# Patient Record
Sex: FEMALE | Race: WHITE | Employment: FULL TIME | URBAN - METROPOLITAN AREA
[De-identification: names, ages, dates, MRNs, and addresses within clinical notes are randomized per-mention and may not be internally consistent; named-entity substitution may affect disease eponyms.]

---

## 2019-03-02 ENCOUNTER — HOSPITAL ENCOUNTER (EMERGENCY)
Age: 19
Discharge: HOME | End: 2019-03-02
Attending: EMERGENCY MEDICINE

## 2019-03-02 DIAGNOSIS — R10.2 PELVIC PAIN IN FEMALE: ICD-10-CM

## 2019-03-02 DIAGNOSIS — N32.89 BLADDER SPASM: Primary | ICD-10-CM

## 2019-03-02 LAB
BACTERIA: ABNORMAL /HPF
BILIRUBIN URINE: NEGATIVE
BLOOD, URINE: ABNORMAL
CLARITY: CLEAR
COLOR: YELLOW
EPITHELIAL CELLS, UA: ABNORMAL /HPF
GLUCOSE URINE: NEGATIVE MG/DL
HCG(URINE) PREGNANCY TEST: NEGATIVE
KETONES, URINE: NEGATIVE MG/DL
LEUKOCYTE ESTERASE, URINE: NEGATIVE
MICROSCOPIC EXAMINATION: YES
MUCUS: ABNORMAL /LPF
NITRITE, URINE: NEGATIVE
PH UA: 6 (ref 5–8)
PROTEIN UA: NEGATIVE MG/DL
RBC UA: ABNORMAL /HPF (ref 0–2)
SPECIFIC GRAVITY UA: 1.02 (ref 1–1.03)
URINE TYPE: ABNORMAL
UROBILINOGEN, URINE: 1 E.U./DL
WBC UA: ABNORMAL /HPF (ref 0–5)

## 2019-03-02 PROCEDURE — 99284 EMERGENCY DEPT VISIT MOD MDM: CPT

## 2019-03-03 LAB — URINE CULTURE, ROUTINE: NORMAL

## 2021-10-20 ENCOUNTER — HOSPITAL ENCOUNTER (EMERGENCY)
Age: 21
Discharge: HOME | End: 2021-10-20
Attending: STUDENT IN AN ORGANIZED HEALTH CARE EDUCATION/TRAINING PROGRAM
Payer: COMMERCIAL

## 2021-10-20 VITALS
DIASTOLIC BLOOD PRESSURE: 89 MMHG | SYSTOLIC BLOOD PRESSURE: 120 MMHG | HEIGHT: 63 IN | WEIGHT: 172 LBS | BODY MASS INDEX: 30.48 KG/M2 | RESPIRATION RATE: 16 BRPM | OXYGEN SATURATION: 96 % | HEART RATE: 89 BPM | TEMPERATURE: 97.8 F

## 2021-10-20 DIAGNOSIS — J06.9 ACUTE UPPER RESPIRATORY INFECTION: ICD-10-CM

## 2021-10-20 DIAGNOSIS — J02.9 ACUTE PHARYNGITIS, UNSPECIFIED ETIOLOGY: Primary | ICD-10-CM

## 2021-10-20 LAB
S PYO AG THROAT QL: NEGATIVE
SARS-COV-2, NAAT: NOT DETECTED

## 2021-10-20 PROCEDURE — 6360000002 HC RX W HCPCS: Performed by: PHYSICIAN ASSISTANT

## 2021-10-20 PROCEDURE — 99284 EMERGENCY DEPT VISIT MOD MDM: CPT

## 2021-10-20 PROCEDURE — 87880 STREP A ASSAY W/OPTIC: CPT

## 2021-10-20 PROCEDURE — 87081 CULTURE SCREEN ONLY: CPT

## 2021-10-20 PROCEDURE — 87635 SARS-COV-2 COVID-19 AMP PRB: CPT

## 2021-10-20 RX ORDER — ASPIRIN 325 MG
325 TABLET ORAL ONCE
Status: COMPLETED | OUTPATIENT
Start: 2021-10-20 | End: 2021-10-20

## 2021-10-20 RX ORDER — DEXAMETHASONE 4 MG/1
10 TABLET ORAL ONCE
Status: COMPLETED | OUTPATIENT
Start: 2021-10-20 | End: 2021-10-20

## 2021-10-20 RX ADMIN — Medication 325 MG: at 11:41

## 2021-10-20 RX ADMIN — DEXAMETHASONE 10 MG: 4 TABLET ORAL at 11:41

## 2021-10-20 NOTE — ED NOTES
Patient to ED from home for a covid exposure. Patient states that she recently was around someone that tested positive. Patient states that she now has a sore throat and some shortness of breath on exertion. Patient has received both doses of covid vaccine. Patient is well appearing and in no visible distress on assessment. Patient respirations are even and unlabored, patient able to answer questions in full sentences, skin warm and dry. Patient maintaining O2 saturation on room air. VSS. Call light is within reach.      Johnny Sanchez RN  10/20/21 1110

## 2021-10-20 NOTE — ED PROVIDER NOTES
**ADVANCED PRACTICE PROVIDER, I HAVE EVALUATED THIS [facility]  ED  EMERGENCY DEPARTMENT ENCOUNTER      Pt Name: Dori Coello  FA  [place] 2000  Date of evaluation: 10/20/2021  Provider: FLAQUITO Early      Chief Complaint:    Chief Complaint   Patient presents with    Concern For COVID-19         Nursing Notes, Past Medical Hx, Past Surgical Hx, Social Hx, Allergies, and Family Hx were all reviewed and agreed with or any disagreements were addressed in the HPI.    HPI: (Location, Duration, Timing, Severity, Quality, Assoc Sx, Context, Modifying factors)    Chief Complaint of pharyngitis. This is a  19 y.o. female who presents via personal transport complaining of pharyngitis, mild shortness of breath with exertion x 3 days. She states she hung out with her friend who had a toddler stick with COVID who is not out of the quarantine window therefore she is concerned about Covid. She has not had any fevers or chills. She does admit to chest pressure and tightness with the dyspnea on exertion. She states she has a history of strep throat and feels this is similar. She describes her sore throat as feeling like there is glass in her throat when she follows. She denies abdominal pain, nausea, vomiting, diarrhea, constipation. She denies dysuria or hematuria. She has not taken anything for her symptoms. She has no other concerns or complaints at this time. PastMedical/Surgical History:      Diagnosis Date    Anxiety     Asthma     Depression     Urinary reflux          Procedure Laterality Date    FRACTURE SURGERY      right pinkie at growth plate       Medications:  Previous Medications    No medications on file         Review of Systems:  (2-9 systems needed)    \"Positives and Pertinent negatives as per HPI\"    Physical Exam:  Physical Exam  Vitals and nursing note reviewed. Constitutional:       Appearance: Normal appearance.  She is not diaphoretic. HENT:      Head: Normocephalic and atraumatic. Nose: Nose normal. No congestion or rhinorrhea. Mouth/Throat:      Mouth: Mucous membranes are moist.      Pharynx: Uvula midline. No pharyngeal swelling, posterior oropharyngeal erythema or uvula swelling. Tonsils: Tonsillar exudate present. Eyes:      General:         Right eye: No discharge. Left eye: No discharge. Cardiovascular:      Rate and Rhythm: Normal rate and regular rhythm. Pulses: Normal pulses. Heart sounds: Normal heart sounds. No murmur heard. No friction rub. No gallop. Pulmonary:      Effort: Pulmonary effort is normal. No respiratory distress. Breath sounds: Normal breath sounds. No stridor. No wheezing, rhonchi or rales. Abdominal:      General: Abdomen is flat. Palpations: Abdomen is soft. Tenderness: There is no abdominal tenderness. There is no right CVA tenderness, left CVA tenderness, guarding or rebound. Musculoskeletal:         General: Normal range of motion. Cervical back: Normal range of motion and neck supple. Skin:     General: Skin is warm and dry. Coloration: Skin is not pale. Neurological:      Mental Status: She is alert and oriented to person, place, and time.    Psychiatric:         Mood and Affect: Mood normal.         Behavior: Behavior normal.         MEDICAL DECISION MAKING    Vitals:    Vitals:    10/20/21 1110   BP: 120/89   Pulse: 89   Resp: 16   Temp: 97.8 °F (36.6 °C)   TempSrc: Oral   SpO2: 96%   Weight: 172 lb (78 kg)   Height: 5' 3\" (1.6 m)       LABS:  Labs Reviewed   STREP SCREEN GROUP A THROAT    Narrative:     Performed at:  [facility]) [address],  [place], [address] [employer]   Phone [phone], RAPID    Narrative:     Performed at:  [address],  [place], [address] [employer]   Phone [phone]   CULTURE, BETA STREP CONFIRM PLATES Remainder of labs reviewed and were negative at this time or not returned at the time of this note. RADIOLOGY:   Non-plain film images such as CT, Ultrasound and MRI are read by the radiologist. FLAQUITO Bernabe have directly visualized the radiologic plain film image(s) with the below findings:      Interpretation per the Radiologist below, if available at the time of this note:    No orders to display        No results found. MEDICAL DECISION MAKING / ED COURSE:          Patient was given:  Medications   dexamethasone (DECADRON) tablet 10 mg (10 mg Oral Given 10/20/21 1141)   aspirin tablet 325 mg (325 mg Oral Given 10/20/21 1141)     Patient was seen and evaluated in the emergency department for concern for Covid. Primary complaint is pharyngitis x3 days. She is hemodynamically stable at triage, she is afebrile, not tachycardic, 96% SpO2 on RA. After discussing with the patient her symptoms are most consistent with strep vs. Covid. I did discuss with the patient that we could obtain a chest x-ray however at this time I do not believe that we will change our management and her lung exam is very reassuring therefore after shared decision making it is decided to forego the chest x-ray at this time. Strep rapid negative, culture pending  Covid rapid negative    A discussion was had with the patient regarding all lab results. She did receive 10 mg of Decadron and aspirin while in the emergency department. I do believe her symptoms are likely viral in etiology at this time although she will receive a phone call of cultured strep results if they are positive she will be treated with an antibiotic at that time. She does not have a primary care physician and will be provided a referral upon discharge. The patient tolerated their visit well. I evaluated the patient. The physician was available for consultation as needed.   The patient and / or the family were informed of the results of any tests, a time was given to answer questions, a plan was proposed and they agreed with plan. CLINICAL IMPRESSION:  1. Acute pharyngitis, unspecified etiology    2. Acute upper respiratory infection      Results for orders placed or performed during the hospital encounter of 10/20/21   Strep screen group a throat    Specimen: Throat   Result Value Ref Range    Rapid Strep A Screen Negative Negative   COVID-19, Rapid    Specimen: Nasopharyngeal Swab   Result Value Ref Range    SARS-CoV-2, NAAT Not Detected Not Detected         I estimate there is LOW risk for EPIGLOTTITIS, PNEUMONIA, MENINGITIS, OR URINARY TRACT INFECTION, thus I consider the discharge disposition reasonable. Also, there is no evidence or peritonitis, sepsis, or toxicity. Antwon Bo and I have discussed the diagnosis and risks, and we agree with discharging home to follow-up with their primary doctor. We also discussed returning to the Emergency Department immediately if new or worsening symptoms occur. We have discussed the symptoms which are most concerning (e.g., changing or worsening pain, trouble swallowing or breating, neck stiffness, fever) that necessitate immediate return. Final Impression    1. Acute pharyngitis, unspecified etiology    2. Acute upper respiratory infection        Discharge Vital Signs:  Blood pressure 120/89, pulse 89, temperature 97.8 °F (36.6 °C), temperature source Oral, resp. rate 16, height 5' 3\" (1.6 m), weight 172 lb (78 kg), SpO2 96 %, not currently breastfeeding.       DISPOSITION        PATIENT REFERRED TO:  Wilmar Hernandez  [address] [place] [address]  [address]  Schedule an appointment as soon as possible for a visit       [facility]  ED  [address] [facility] [address]  Go to   If symptoms worsen      DISCHARGE MEDICATIONS:  New Prescriptions    No medications on file       DISCONTINUED MEDICATIONS:  Discontinued Medications    ALBUTEROL (VENTOLIN HFA) 108 (90 BASE) MCG/ACT INHALER    Inhale 2 puffs into the lungs every 6 hours as needed for Wheezing.     SERTRALINE (ZOLOFT) 100 MG TABLET    Take 150 mg by mouth daily              (Please note the MDM and HPI sections of this note were completed with a voice recognition program.  Efforts were made to edit the dictations but occasionally words are mis-transcribed.)    Electronically signed, Brian Hemphill, [address],           Brian Hemphill, [address]  10/20/21 1241

## 2021-10-20 NOTE — ED PROVIDER NOTES
Patient seen solely by midlevel provider. I did not see this patient and was not involved in her care.      Nelson Richardson MD  10/20/21 6424

## 2021-10-22 LAB — S PYO THROAT QL CULT: NORMAL

## 2022-03-05 ENCOUNTER — HOSPITAL ENCOUNTER (EMERGENCY)
Age: 22
Discharge: HOME | End: 2022-03-05
Attending: STUDENT IN AN ORGANIZED HEALTH CARE EDUCATION/TRAINING PROGRAM
Payer: COMMERCIAL

## 2022-03-05 ENCOUNTER — APPOINTMENT (OUTPATIENT)
Dept: CT IMAGING | Age: 22
End: 2022-03-05
Payer: COMMERCIAL

## 2022-03-05 VITALS
HEART RATE: 117 BPM | TEMPERATURE: 98.9 F | SYSTOLIC BLOOD PRESSURE: 121 MMHG | OXYGEN SATURATION: 97 % | DIASTOLIC BLOOD PRESSURE: 76 MMHG | RESPIRATION RATE: 18 BRPM

## 2022-03-05 DIAGNOSIS — N12 PYELONEPHRITIS: Primary | ICD-10-CM

## 2022-03-05 LAB
A/G RATIO: 1.2 (ref 1.1–2.2)
ALBUMIN SERPL-MCNC: 4.1 G/DL (ref 3.4–5)
ALP BLD-CCNC: 85 U/L (ref 40–129)
ALT SERPL-CCNC: 27 U/L (ref 10–40)
AMORPHOUS: ABNORMAL /HPF
ANION GAP SERPL CALCULATED.3IONS-SCNC: 13 MMOL/L (ref 3–16)
AST SERPL-CCNC: 27 U/L (ref 15–37)
BACTERIA WET PREP: NORMAL
BACTERIA: ABNORMAL /HPF
BACTERIA: ABNORMAL /HPF
BASOPHILS ABSOLUTE: 0 K/UL (ref 0–0.2)
BASOPHILS RELATIVE PERCENT: 0.2 %
BILIRUB SERPL-MCNC: 0.6 MG/DL (ref 0–1)
BILIRUBIN URINE: NEGATIVE
BILIRUBIN URINE: NEGATIVE
BLOOD, URINE: ABNORMAL
BLOOD, URINE: ABNORMAL
BUN BLDV-MCNC: 9 MG/DL (ref 7–20)
CALCIUM SERPL-MCNC: 9.3 MG/DL (ref 8.3–10.6)
CHLORIDE BLD-SCNC: 100 MMOL/L (ref 99–110)
CLARITY: CLEAR
CLARITY: CLEAR
CLUE CELLS: NORMAL
CO2: 22 MMOL/L (ref 21–32)
COLOR: YELLOW
COLOR: YELLOW
CREAT SERPL-MCNC: 0.7 MG/DL (ref 0.6–1.1)
EOSINOPHILS ABSOLUTE: 0 K/UL (ref 0–0.6)
EOSINOPHILS RELATIVE PERCENT: 0 %
EPITHELIAL CELLS WET PREP: NORMAL
EPITHELIAL CELLS, UA: ABNORMAL /HPF (ref 0–5)
EPITHELIAL CELLS, UA: ABNORMAL /HPF (ref 0–5)
GFR AFRICAN AMERICAN: >60
GFR NON-AFRICAN AMERICAN: >60
GLUCOSE BLD-MCNC: 112 MG/DL (ref 70–99)
GLUCOSE URINE: NEGATIVE MG/DL
GLUCOSE URINE: NEGATIVE MG/DL
HCG QUALITATIVE: NEGATIVE
HCT VFR BLD CALC: 39.5 % (ref 36–48)
HEMOGLOBIN: 13.3 G/DL (ref 12–16)
KETONES, URINE: 15 MG/DL
KETONES, URINE: 40 MG/DL
LEUKOCYTE ESTERASE, URINE: ABNORMAL
LEUKOCYTE ESTERASE, URINE: ABNORMAL
LIPASE: 8 U/L (ref 13–60)
LYMPHOCYTES ABSOLUTE: 1.3 K/UL (ref 1–5.1)
LYMPHOCYTES RELATIVE PERCENT: 7 %
MCH RBC QN AUTO: 32.8 PG (ref 26–34)
MCHC RBC AUTO-ENTMCNC: 33.7 G/DL (ref 31–36)
MCV RBC AUTO: 97.3 FL (ref 80–100)
MICROSCOPIC EXAMINATION: YES
MICROSCOPIC EXAMINATION: YES
MONOCYTES ABSOLUTE: 0.9 K/UL (ref 0–1.3)
MONOCYTES RELATIVE PERCENT: 4.9 %
NEUTROPHILS ABSOLUTE: 16.6 K/UL (ref 1.7–7.7)
NEUTROPHILS RELATIVE PERCENT: 87.9 %
NITRITE, URINE: POSITIVE
NITRITE, URINE: POSITIVE
PDW BLD-RTO: 13.2 % (ref 12.4–15.4)
PH UA: 6 (ref 5–8)
PH UA: 6.5 (ref 5–8)
PLATELET # BLD: 296 K/UL (ref 135–450)
PMV BLD AUTO: 7.8 FL (ref 5–10.5)
POTASSIUM SERPL-SCNC: 3.7 MMOL/L (ref 3.5–5.1)
PROTEIN UA: 100 MG/DL
PROTEIN UA: 30 MG/DL
RBC # BLD: 4.05 M/UL (ref 4–5.2)
RBC UA: ABNORMAL /HPF (ref 0–4)
RBC UA: ABNORMAL /HPF (ref 0–4)
RBC WET PREP: NORMAL
SODIUM BLD-SCNC: 135 MMOL/L (ref 136–145)
SOURCE WET PREP: NORMAL
SPECIFIC GRAVITY UA: 1.01 (ref 1–1.03)
SPECIFIC GRAVITY UA: 1.02 (ref 1–1.03)
TOTAL PROTEIN: 7.5 G/DL (ref 6.4–8.2)
TRICHOMONAS PREP: NORMAL
URINE REFLEX TO CULTURE: YES
URINE TYPE: ABNORMAL
URINE TYPE: ABNORMAL
UROBILINOGEN, URINE: 0.2 E.U./DL
UROBILINOGEN, URINE: 1 E.U./DL
WBC # BLD: 18.9 K/UL (ref 4–11)
WBC UA: >100 /HPF (ref 0–5)
WBC UA: ABNORMAL /HPF (ref 0–5)
WBC WET PREP: NORMAL
YEAST WET PREP: NORMAL

## 2022-03-05 PROCEDURE — 36415 COLL VENOUS BLD VENIPUNCTURE: CPT

## 2022-03-05 PROCEDURE — 87077 CULTURE AEROBIC IDENTIFY: CPT

## 2022-03-05 PROCEDURE — 87491 CHLMYD TRACH DNA AMP PROBE: CPT

## 2022-03-05 PROCEDURE — 84703 CHORIONIC GONADOTROPIN ASSAY: CPT

## 2022-03-05 PROCEDURE — 99283 EMERGENCY DEPT VISIT LOW MDM: CPT

## 2022-03-05 PROCEDURE — 81001 URINALYSIS AUTO W/SCOPE: CPT

## 2022-03-05 PROCEDURE — 87086 URINE CULTURE/COLONY COUNT: CPT

## 2022-03-05 PROCEDURE — 96374 THER/PROPH/DIAG INJ IV PUSH: CPT

## 2022-03-05 PROCEDURE — 2580000003 HC RX 258: Performed by: STUDENT IN AN ORGANIZED HEALTH CARE EDUCATION/TRAINING PROGRAM

## 2022-03-05 PROCEDURE — 80053 COMPREHEN METABOLIC PANEL: CPT

## 2022-03-05 PROCEDURE — 87591 N.GONORRHOEAE DNA AMP PROB: CPT

## 2022-03-05 PROCEDURE — 83690 ASSAY OF LIPASE: CPT

## 2022-03-05 PROCEDURE — 6360000004 HC RX CONTRAST MEDICATION: Performed by: STUDENT IN AN ORGANIZED HEALTH CARE EDUCATION/TRAINING PROGRAM

## 2022-03-05 PROCEDURE — 85025 COMPLETE CBC W/AUTO DIFF WBC: CPT

## 2022-03-05 PROCEDURE — 74177 CT ABD & PELVIS W/CONTRAST: CPT

## 2022-03-05 PROCEDURE — 87186 SC STD MICRODIL/AGAR DIL: CPT

## 2022-03-05 PROCEDURE — 6360000002 HC RX W HCPCS: Performed by: STUDENT IN AN ORGANIZED HEALTH CARE EDUCATION/TRAINING PROGRAM

## 2022-03-05 PROCEDURE — 87210 SMEAR WET MOUNT SALINE/INK: CPT

## 2022-03-05 PROCEDURE — 96375 TX/PRO/DX INJ NEW DRUG ADDON: CPT

## 2022-03-05 RX ORDER — ONDANSETRON 2 MG/ML
8 INJECTION INTRAMUSCULAR; INTRAVENOUS ONCE
Status: COMPLETED | OUTPATIENT
Start: 2022-03-05 | End: 2022-03-05

## 2022-03-05 RX ORDER — ACETAMINOPHEN 325 MG/1
650 TABLET ORAL EVERY 6 HOURS PRN
COMMUNITY

## 2022-03-05 RX ORDER — SODIUM CHLORIDE, SODIUM LACTATE, POTASSIUM CHLORIDE, AND CALCIUM CHLORIDE .6; .31; .03; .02 G/100ML; G/100ML; G/100ML; G/100ML
1000 INJECTION, SOLUTION INTRAVENOUS ONCE
Status: COMPLETED | OUTPATIENT
Start: 2022-03-05 | End: 2022-03-05

## 2022-03-05 RX ORDER — ONDANSETRON 4 MG/1
8 TABLET, ORALLY DISINTEGRATING ORAL EVERY 8 HOURS PRN
Qty: 20 TABLET | Refills: 0 | Status: SHIPPED | OUTPATIENT
Start: 2022-03-05

## 2022-03-05 RX ORDER — KETOROLAC TROMETHAMINE 30 MG/ML
15 INJECTION, SOLUTION INTRAMUSCULAR; INTRAVENOUS ONCE
Status: COMPLETED | OUTPATIENT
Start: 2022-03-05 | End: 2022-03-05

## 2022-03-05 RX ORDER — NAPROXEN 500 MG/1
500 TABLET ORAL 2 TIMES DAILY WITH MEALS
Qty: 30 TABLET | Refills: 0 | Status: SHIPPED | OUTPATIENT
Start: 2022-03-05

## 2022-03-05 RX ORDER — MORPHINE SULFATE 4 MG/ML
4 INJECTION, SOLUTION INTRAMUSCULAR; INTRAVENOUS ONCE
Status: COMPLETED | OUTPATIENT
Start: 2022-03-05 | End: 2022-03-05

## 2022-03-05 RX ORDER — CIPROFLOXACIN 500 MG/1
500 TABLET, FILM COATED ORAL 2 TIMES DAILY
Qty: 14 TABLET | Refills: 0 | Status: SHIPPED | OUTPATIENT
Start: 2022-03-05 | End: 2022-03-12

## 2022-03-05 RX ADMIN — ONDANSETRON 8 MG: 2 INJECTION INTRAMUSCULAR; INTRAVENOUS at 17:40

## 2022-03-05 RX ADMIN — IOPAMIDOL 80 ML: 755 INJECTION, SOLUTION INTRAVENOUS at 17:58

## 2022-03-05 RX ADMIN — KETOROLAC TROMETHAMINE 15 MG: 30 INJECTION, SOLUTION INTRAMUSCULAR at 19:39

## 2022-03-05 RX ADMIN — MORPHINE SULFATE 4 MG: 4 INJECTION INTRAVENOUS at 17:42

## 2022-03-05 RX ADMIN — SODIUM CHLORIDE, POTASSIUM CHLORIDE, SODIUM LACTATE AND CALCIUM CHLORIDE 1000 ML: 600; 310; 30; 20 INJECTION, SOLUTION INTRAVENOUS at 17:43

## 2022-03-05 ASSESSMENT — PAIN DESCRIPTION - PAIN TYPE: TYPE: ACUTE PAIN

## 2022-03-05 ASSESSMENT — PAIN DESCRIPTION - LOCATION: LOCATION: ABDOMEN;BACK;GROIN

## 2022-03-05 ASSESSMENT — PAIN DESCRIPTION - FREQUENCY: FREQUENCY: CONTINUOUS

## 2022-03-05 ASSESSMENT — PAIN DESCRIPTION - ORIENTATION: ORIENTATION: RIGHT;LEFT;LOWER

## 2022-03-05 ASSESSMENT — PAIN DESCRIPTION - DESCRIPTORS: DESCRIPTORS: ACHING

## 2022-03-05 ASSESSMENT — PAIN SCALES - GENERAL
PAINLEVEL_OUTOF10: 10
PAINLEVEL_OUTOF10: 10

## 2022-03-05 ASSESSMENT — PAIN - FUNCTIONAL ASSESSMENT: PAIN_FUNCTIONAL_ASSESSMENT: 0-10

## 2022-03-05 NOTE — ED PROVIDER NOTES
[address] [facility] RESIDENT NOTE       Date of evaluation: 3/5/2022    Chief Complaint     Abdominal Pain (abdominal/groin pain. states she woke up in sweats and has had bad lower abdominal pain x1 wk worsened yesterday.)      History of Present Illness     Akash Leonard is a 19 y.o. female presenting with abdominal pain. Patient reports 1 week of bilateral lower quadrant abdominal pain which has been worsening and associated with sweats, chills, dysuria, and nausea without vomiting. She denies any prior history of the same. Presents today as pain continues to worsen. Otherwise denies any fever, chest pain, shortness of breath, diarrhea, vaginal bleeding or discharge, or other symptoms at this time. Patient has IUD in place and does not have periods at this time. Other than stated above, no additional aggravating or alleviating factors are noted. Review of Systems     A complete review of systems was performed and is negative except as otherwise noted in the HPI. Past Medical, Surgical, Family, and Social History     She has a past medical history of Anxiety, Asthma, Depression, and Urinary reflux. She has a past surgical history that includes fracture surgery. Her family history includes Bipolar Disorder in her father; Kidney Disease in her mother. She reports that she has never smoked. She has never used smokeless tobacco. She reports current alcohol use. She reports that she does not use drugs. Medications     Previous Medications    ACETAMINOPHEN (TYLENOL) 325 MG TABLET    Take 650 mg by mouth every 6 hours as needed for Pain       Allergies     She is allergic to penicillins. Physical Exam     INITIAL VITALS: BP: 121/76, Temp: 98.9 °F (37.2 °C), Pulse: 117, Resp: 18, SpO2: 97 %   General:  Well appearing. No acute distress  Eyes:  PERRL. No discharge from eyes   ENT:  No discharge from nose.  OP clear  Neck:  Supple, trachea midline  Pulmonary:   Non-labored breathing. Breath sounds clear bilaterally  Cardiac:  Regular rate and rhythm. No murmurs  Abdomen:  Soft. Non-distended. Moderately tender to palpation in bilateral lower quadrants without rebound or guarding. No flank pain appreciated. Musculoskeletal:  No long bone deformity. Vascular:  Extremities warm and perfused. Normal pulses in all 4 extremities  Skin:  Dry, no rashes  Extremities:  No peripheral edema  Neuro: Alert. Moves all four extremities to command. Sensation grossly intact to light touch. Speech and mentation normal. No focal deficit. Gait narrow and stable. Diagnostic Results       RADIOLOGY:  CT ABDOMEN PELVIS W IV CONTRAST Additional Contrast? None   Final Result      Very subtle urothelial enhancement of the left and to a greater degree right renal pelvis and proximal right ureter with mild associated inflammatory fat stranding suggestive of infection/inflammation. Normal enhancement of the kidneys without suspicious CT findings to suggest pyelonephritis. No calculi or obstruction.           LABS:   Results for orders placed or performed during the hospital encounter of 03/05/22   Wet prep, genital    Specimen: Vaginal   Result Value Ref Range    Trichomonas Prep None Seen     Yeast, Wet Prep None Seen     Clue Cells, Wet Prep None Seen     WBC, Wet Prep 1+     RBC, Wet Prep 1+     Epi Cells 3+     Bacteria 2+     Source Wet Prep Vaginal    CBC with Auto Differential   Result Value Ref Range    WBC 18.9 (H) 4.0 - 11.0 K/uL    RBC 4.05 4.00 - 5.20 M/uL    Hemoglobin 13.3 12.0 - 16.0 g/dL    Hematocrit 39.5 36.0 - 48.0 %    MCV 97.3 80.0 - 100.0 fL    MCH 32.8 26.0 - 34.0 pg    MCHC 33.7 31.0 - 36.0 g/dL    RDW 13.2 12.4 - 15.4 %    Platelets 971 239 - 398 K/uL    MPV 7.8 5.0 - 10.5 fL    Neutrophils % 87.9 %    Lymphocytes % 7.0 %    Monocytes % 4.9 %    Eosinophils % 0.0 %    Basophils % 0.2 %    Neutrophils Absolute 16.6 (H) 1.7 - 7.7 K/uL Lymphocytes Absolute 1.3 1.0 - 5.1 K/uL    Monocytes Absolute 0.9 0.0 - 1.3 K/uL    Eosinophils Absolute 0.0 0.0 - 0.6 K/uL    Basophils Absolute 0.0 0.0 - 0.2 K/uL   Comprehensive Metabolic Panel   Result Value Ref Range    Sodium 135 (L) 136 - 145 mmol/L    Potassium 3.7 3.5 - 5.1 mmol/L    Chloride 100 99 - 110 mmol/L    CO2 22 21 - 32 mmol/L    Anion Gap 13 3 - 16    Glucose 112 (H) 70 - 99 mg/dL    BUN 9 7 - 20 mg/dL    CREATININE 0.7 0.6 - 1.1 mg/dL    GFR Non-African American >60 >60    GFR African American >60 >60    Calcium 9.3 8.3 - 10.6 mg/dL    Total Protein 7.5 6.4 - 8.2 g/dL    Albumin 4.1 3.4 - 5.0 g/dL    Albumin/Globulin Ratio 1.2 1.1 - 2.2    Total Bilirubin 0.6 0.0 - 1.0 mg/dL    Alkaline Phosphatase 85 40 - 129 U/L    ALT 27 10 - 40 U/L    AST 27 15 - 37 U/L   HCG Qualitative, Serum   Result Value Ref Range    hCG Qual Negative Detects HCG level >10 MIU/mL   Lipase   Result Value Ref Range    Lipase 8.0 (L) 13.0 - 60.0 U/L   Urinalysis with Reflex to Culture    Specimen: Urine, clean catch   Result Value Ref Range    Color, UA Yellow Straw/Yellow    Clarity, UA Clear Clear    Glucose, Ur Negative Negative mg/dL    Bilirubin Urine Negative Negative    Ketones, Urine 40 (A) Negative mg/dL    Specific Gravity, UA 1.025 1.005 - 1.030    Blood, Urine MODERATE (A) Negative    pH, UA 6.0 5.0 - 8.0    Protein,  (A) Negative mg/dL    Urobilinogen, Urine 1.0 <2.0 E.U./dL    Nitrite, Urine POSITIVE (A) Negative    Leukocyte Esterase, Urine MODERATE (A) Negative    Microscopic Examination YES     Urine Type NotGiven     Urine Reflex to Culture Yes    Microscopic Urinalysis   Result Value Ref Range    WBC, UA >100 (A) 0 - 5 /HPF    RBC, UA 5-10 (A) 0 - 4 /HPF    Epithelial Cells, UA 11-20 (A) 0 - 5 /HPF    Bacteria, UA 4+ (A) None Seen /HPF   Urinalysis with Microscopic   Result Value Ref Range    Color, UA Yellow Straw/Yellow    Clarity, UA Clear Clear    Glucose, Ur Negative Negative mg/dL Bilirubin Urine Negative Negative    Ketones, Urine 15 (A) Negative mg/dL    Specific Gravity, UA 1.010 1.005 - 1.030    Blood, Urine SMALL (A) Negative    pH, UA 6.5 5.0 - 8.0    Protein, UA 30 (A) Negative mg/dL    Urobilinogen, Urine 0.2 <2.0 E.U./dL    Nitrite, Urine POSITIVE (A) Negative    Leukocyte Esterase, Urine MODERATE (A) Negative    Microscopic Examination YES     Urine Type NotGiven     WBC, UA  (A) 0 - 5 /HPF    RBC, UA 5-10 (A) 0 - 4 /HPF    Epithelial Cells, UA 6-10 (A) 0 - 5 /HPF    Bacteria, UA 3+ (A) None Seen /HPF    Amorphous, UA 1+ /HPF       RECENT VITALS:  BP: 121/76, Temp: 98.9 °F (37.2 °C), Pulse: 117,Resp: 18, SpO2: 97 %       ED Course     Nursing Notes, Past Medical Hx, Past Surgical Hx, Social Hx, Allergies, and Family Hx were reviewed. The patient was given the followingmedications:  Orders Placed This Encounter   Medications    ondansetron (ZOFRAN) injection 8 mg    morphine injection 4 mg    lactated ringers bolus    iopamidol (ISOVUE-370) 76 % injection 80 mL    ciprofloxacin (CIPRO) 500 MG tablet     Sig: Take 1 tablet by mouth 2 times daily for 7 days     Dispense:  14 tablet     Refill:  0    ondansetron (ZOFRAN ODT) 4 MG disintegrating tablet     Sig: Take 2 tablets by mouth every 8 hours as needed for Nausea     Dispense:  20 tablet     Refill:  0    naproxen (NAPROSYN) 500 MG tablet     Sig: Take 1 tablet by mouth 2 times daily (with meals)     Dispense:  30 tablet     Refill:  0       CONSULTS:  None    MEDICAL DECISION MAKING / ASSESSMENT / Merry Myles is a 19 y.o. female with a history and presentation as described above in HPI. The patient was evaluated by myself and the ED Attending Physician, Dr. Funmi Hassan. All management and disposition plans were discussed and agreed upon. Upon presentation, the patient was well appearing and had vitals notable for tachycardia to 110s otherwise within normal limits.   Patient with 1 week of worsening lower abdominal pain associated with dysuria and nausea. Denies any other symptoms including diarrhea, vomiting, fever, or vaginal discharge. Patient initially treated symptomatically with Zofran, morphine, IV fluid bolus. Screening laboratory studies were obtained and notable for significant leukocytosis to 18.9. Initial UA significant for greater than 100 white blood cells and 5-10 red blood cells though with many squames. Was notable for positive nitrates and leukocyte esterase. Given patient's abdominal pain and nausea associated with this urine, concern for infected stone versus pyelonephritis and a CT of the abdomen pelvis with IV contrast was ordered. Pregnancy notably negative. Hepatic panel and lipase within normal limits. Pelvic examination was performed and notable for normal external genitalia and scant white cervical discharge without vaginal bleeding. No CMT or adnexal tenderness. Wet prep and gonorrhea/chlamydia swabs were obtained. Urinalysis was repeated as a clean-catch and still with some epithelial cells but continues to appear consistent with infection. Wet prep without pathogens. CT scan notable for \"Very subtle urothelial enhancement of the left and to a greater degree right renal pelvis and proximal right ureter with mild associated inflammatory fat stranding suggestive of infection/inflammation. \"  No ureterolithiasis visualized. In this setting, favor the patient symptoms are secondary to pyelonephritis and will plan to treat as such. She will be discharged with prescriptions for Zofran and naproxen for further symptom control. Will also be discharged with prescription for ciprofloxacin for treatment of presumed pyelonephritis. Given strict return precautions for new or worsening symptoms and deemed appropriate for discharge. Advised to follow-up with her regular doctor.     Medications received during this ED visit:    Medications   ondansetron (ZOFRAN) injection 8 mg (8 mg IntraVENous Given 3/5/22 1740)   morphine injection 4 mg (4 mg IntraVENous Given 3/5/22 1742)   lactated ringers bolus (1,000 mLs IntraVENous New Bag 3/5/22 1743)   iopamidol (ISOVUE-370) 76 % injection 80 mL (80 mLs IntraVENous Given 3/5/22 1758)       Clinical Impression     1. Pyelonephritis        Disposition     PATIENT REFERRED TO:  No follow-up provider specified. DISCHARGE MEDICATIONS:  New Prescriptions    CIPROFLOXACIN (CIPRO) 500 MG TABLET    Take 1 tablet by mouth 2 times daily for 7 days    NAPROXEN (NAPROSYN) 500 MG TABLET    Take 1 tablet by mouth 2 times daily (with meals)    ONDANSETRON (ZOFRAN ODT) 4 MG DISINTEGRATING TABLET    Take 2 tablets by mouth every 8 hours as needed for Nausea       DISPOSITION    Discharge: At this time, the patient was deemed appropriate for discharge. Workup, treatment and diagnosis were discussed with the patient and/or family members; the patient agrees to the plan and all questions were addressed and answered. My customary discharge instructions, including strict return precautions for new or worsening symptoms or any concern she believes warrants acute physician evaluation, were provided.  she was subsequently sent home in stable/improved condition       Juanita Buck MD  03/05/22 4009

## 2022-03-05 NOTE — ED PROVIDER NOTES
ED Attending Attestation Note     Date of evaluation: 3/5/2022    This patient was seen by the resident. I have seen and examined the patient, agree with the workup, evaluation, management and diagnosis. The care plan has been discussed. My assessment reveals 68-year-old female presenting with chief complaint of lower abdominal pain. Patient has a history of recurrent UTIs, has some urinary symptoms today. Labs, potential imaging.   Antibiotics if indicated     Garry Guadarrama MD  03/05/22 1943

## 2022-03-07 LAB
ORGANISM: ABNORMAL
URINE CULTURE, ROUTINE: ABNORMAL
URINE CULTURE, ROUTINE: ABNORMAL

## 2022-03-09 LAB
C TRACH DNA GENITAL QL NAA+PROBE: NEGATIVE
N. GONORRHOEAE DNA: NEGATIVE

## 2022-12-09 ENCOUNTER — HOSPITAL ENCOUNTER (EMERGENCY)
Age: 22
Discharge: HOME | End: 2022-12-09
Attending: STUDENT IN AN ORGANIZED HEALTH CARE EDUCATION/TRAINING PROGRAM
Payer: MEDICAID

## 2022-12-09 VITALS
SYSTOLIC BLOOD PRESSURE: 132 MMHG | HEART RATE: 92 BPM | OXYGEN SATURATION: 97 % | DIASTOLIC BLOOD PRESSURE: 99 MMHG | RESPIRATION RATE: 16 BRPM | TEMPERATURE: 98.6 F

## 2022-12-09 DIAGNOSIS — K08.89 PAIN, DENTAL: Primary | ICD-10-CM

## 2022-12-09 PROCEDURE — 64400 NJX AA&/STRD TRIGEMINAL NRV: CPT

## 2022-12-09 PROCEDURE — 6370000000 HC RX 637 (ALT 250 FOR IP): Performed by: REGISTERED NURSE

## 2022-12-09 PROCEDURE — 2500000003 HC RX 250 WO HCPCS: Performed by: REGISTERED NURSE

## 2022-12-09 PROCEDURE — 99283 EMERGENCY DEPT VISIT LOW MDM: CPT

## 2022-12-09 RX ORDER — CLINDAMYCIN HYDROCHLORIDE 150 MG/1
450 CAPSULE ORAL 3 TIMES DAILY
Qty: 63 CAPSULE | Refills: 0 | Status: SHIPPED | OUTPATIENT
Start: 2022-12-09 | End: 2022-12-16

## 2022-12-09 RX ORDER — BUPIVACAINE HYDROCHLORIDE 5 MG/ML
30 INJECTION, SOLUTION EPIDURAL; INTRACAUDAL ONCE
Status: COMPLETED | OUTPATIENT
Start: 2022-12-09 | End: 2022-12-09

## 2022-12-09 RX ADMIN — BUPIVACAINE HYDROCHLORIDE 150 MG: 5 INJECTION, SOLUTION EPIDURAL; INTRACAUDAL; PERINEURAL at 19:31

## 2022-12-09 RX ADMIN — CLINDAMYCIN HYDROCHLORIDE 450 MG: 300 CAPSULE ORAL at 18:36

## 2022-12-09 ASSESSMENT — PAIN SCALES - GENERAL: PAINLEVEL_OUTOF10: 10

## 2022-12-09 ASSESSMENT — PAIN - FUNCTIONAL ASSESSMENT: PAIN_FUNCTIONAL_ASSESSMENT: 0-10

## 2022-12-09 ASSESSMENT — PAIN DESCRIPTION - DESCRIPTORS: DESCRIPTORS: ACHING

## 2022-12-09 ASSESSMENT — PAIN DESCRIPTION - LOCATION: LOCATION: MOUTH

## 2022-12-09 NOTE — ED PROVIDER NOTES
1 [facility]  EMERGENCY DEPARTMENT ENCOUNTER          NURSE PRACTITIONER NOTE       Date of evaluation: 12/9/2022    Chief Complaint     Dental Pain (Pain on going for the past two days )    History of Present Illness     Majo Berg is a 23 y.o. female who presents to the emergency department today complaining of dental pain. Patient states she was eating Doritos yesterday when she cracked one of her left bottom molars. Patient reports having a significant amount of pain since this incident occurred. She was able to contact a local dentist clinic who plans to see her on Monday 12/12/22. The dental clinic recommended the patient begin alternating ibuprofen and Tylenol for pain until she can be seen. However, the patient is presenting to the emergency department today stating she cannot take the pain. She states ibuprofen and Tylenol are not touching it. She states she has been having difficulty eating or drinking since. She denies any known fevers. But she does report noting some chills today. She denies any facial swelling, swelling under her tongue, or neck swelling. Patient states she does have an allergy to penicillins with a reaction of anaphylaxis. She denies other medical problems. Does not take medications daily. No concern for pregnancy. Review of Systems     Review of systems negative with exception of those noted in the HPI. Past Medical, Surgical, Family, and Social History     She has a past medical history of Anxiety, Asthma, Depression, and Urinary reflux. She has a past surgical history that includes fracture surgery. Her family history includes Bipolar Disorder in her father; Kidney Disease in her mother. She reports that she has never smoked. She has never used smokeless tobacco. She reports current alcohol use. She reports that she does not use drugs.     Medications     Previous Medications    ACETAMINOPHEN (TYLENOL) 325 MG TABLET    Take 650 mg by mouth every 6 hours as needed for Pain    NAPROXEN (NAPROSYN) 500 MG TABLET    Take 1 tablet by mouth 2 times daily (with meals)    ONDANSETRON (ZOFRAN ODT) 4 MG DISINTEGRATING TABLET    Take 2 tablets by mouth every 8 hours as needed for Nausea       Allergies     She is allergic to penicillins. Physical Exam     INITIAL VITALS: BP: (!) 132/99, Temp: 98.6 °F (37 °C), Heart Rate: 92, Resp: 16, SpO2: 97 %     General: 23 y.o.  female tearful but in no acute distress, well developed, well nourished, non-toxic appearance. HEENT: Atraumatic, normocephalic. EOMs intact. Fractured left lower second molar. Mild amount of swelling w/ ecchymosis on the buccal side of the gingiva. No erythema or purulent drainage. No sublingual, submental, facial swelling. No trismus. Normal voice. Handling secretions without difficulty. Neck:  Full range of motion. No swelling, tenderness, lymphadenopathy. Airway is patent. Chest/pulm: Respiratory rate normal. Speaks in complete sentences, no respiratory distress, lungs CTA     Cardiovascular: Heart rate normal. RRR, no murmurs, rubs, gallops     Abdomen: No gross distension. Soft, non-tender, non-peritoneal.     : Deferred. Musculoskeletal: No obvious joint deformity. Ambulates without difficulty. Neuro: A&O x 4. Normal speech without dysarthria or aphasia. Moves all extremities spontaneously and symmetrically. Gait normal without ataxia. Skin: Warm, dry. No obvious rashes, petechiae, or purpura. Psych: Appropriate mood and affect, normal interaction. Diagnostic Results     RADIOLOGY:  No orders to display     LABS:   No results found for this visit on 12/09/22. ED BEDSIDE ULTRASOUND:  No results found. RECENT VITALS:  BP: (!) 132/99, Temp: 98.6 °F (37 °C), Heart Rate: 92, Resp: 16, SpO2: 97 %     Procedures     Inferior alveolar block     Dr. Reena Goff present supervising procedure. The procedure was explained to both the patient and her father.   I obtained verbal consent from the patient prior to procedure beginning. The patient was placed in appropriate positioning. A total of 2.5 mL of bupivacaine was injected. Patient experienced mild discomfort during the procedure, but overall tolerated very well. ED Course     Nursing Notes, Past Medical Hx, Past Surgical Hx, Social Hx, Allergies, and Family Hx were reviewed. The patient was given the following medications:  Orders Placed This Encounter   Medications    bupivacaine (PF) (MARCAINE) 0.5 % injection 150 mg    clindamycin (CLEOCIN) capsule 450 mg     Order Specific Question:   Antimicrobial Indications     Answer: Other     Order Specific Question:   Other Abx Indication     Answer:   dental    clindamycin (CLEOCIN) 150 MG capsule     Sig: Take 3 capsules by mouth 3 times daily for 7 days     Dispense:  63 capsule     Refill:  0       CONSULTS:  None    MEDICAL DECISION MAKING / ASSESSMENT / Pat Reuben is a 23 y.o. female who presents emergency department complaining of dental pain. Patient physical exam is notable for a fractured left lower second molar. There is no evidence of infection or abscess formation. There is no gingival erythema, pus, or drainage. She has no facial swelling concerning for deep space infection. No evidence of Edmund's angina, no submental or sublingual swelling. She has normal voice and no trismus. She is handling secretions without difficulty. Airway is patent and she has normal work of breathing and is satting well on room air. Ultimately, the patient will need removal of fractured left lower molar for definitive treatment of her pain. She has a scheduled dental clinic appointment for 9 AM on Monday morning. We discussed possible options for pain control in addition to her over-the-counter ibuprofen and Tylenol. Decision was made to move forward with an inferior alveolar block for further pain control.   Procedure was performed as noted above. No complications. At this time, I feel the patient is stable for discharge. She will be discharged with a prescription for clindamycin for antibiotic prophylaxis in setting of fractured molar. She was instructed to take antibiotics as prescribed and maintain her dental clinic appointment on Monday. She was also encouraged to continue needing ibuprofen and Tylenol for all. She was given very strict return precautions as outlined in the AVS. patient was understanding and agreeable to this plan of care. This patient was also evaluated by the attending physician. All care plans werediscussed and agreed upon. Clinical Impression     1.  Pain, dental        Disposition     PATIENT REFERRED TO:  [facility] [address]  [address] [address]  [phone]      As needed    [facility]  [address] [place] [address]  [phone]    If symptoms worsen    DISCHARGE MEDICATIONS:  New Prescriptions    CLINDAMYCIN (CLEOCIN) 150 MG CAPSULE    Take 3 capsules by mouth 3 times daily for 7 days       DISPOSITION Decision To Discharge 12/09/2022 07:17:54 PM       Bella Glance App, AFSHAN - CNP  12/09/22 [id number]

## 2022-12-09 NOTE — ED PROVIDER NOTES
ED Attending Attestation Note     Date of evaluation: 12/9/2022    This patient was seen by the advanced practice provider. I have seen and examined the patient, agree with the workup, evaluation, management and diagnosis. The care plan has been discussed. My assessment reveals a well-appearing woman with dental pain. The pain began yesterday while eating chips. On exam    There were no vitals filed for this visit. General:  Well appearing. No acute distress. Non-toxic appearing    Eyes:  Pupils equally round . No discharge from eyes. ENT: The external ears are normal. The TMs are clear bilaterally. The external nose is unremarkable, and there is no significant nasal or ear drainage on exam. The mucosa are moist. The uvula is midline, elevates in the midline, and is free of edema. There are no tonsillar exudates. The submandibular space is soft and free of significant swelling. There is no drooling, dysphonia, pooled secretions, stridor, or active bleeding. The overall quality of dentition is poor with multiple, diffuse caries. The mastoid is nontender. The buccal space is free of significant edema. There is no trismus. Teeth #18,19,20 are TTP with an area of ecchymosis in the periapical region of Tooth #19 without fluctuance. There is no bleeding. Neck:  Supple. Trachea midline. Pulmonary:   Non-labored breathing. Symmetric chest wall excursion   Abdomen:   Non-distended. Musculoskeletal:  No long bone deformity. No ankle or wrist deformity. Vascular:  Extremities warm and perfused. Skin:  No rash. Warm. Neuro: Alert and conversant. No aphasia or dysarthria  Moving all extremities  Psych: normal affect and range, no response to internal stimuli    Extremities:  No peripheral edema. LE symmetric. Exam is most consistent with a small area of ecchymosis and much less consistent with any type of fluctuant or purulent collection in the periapical region.   Given the history in addition I felt that the patient was unlikely to receive any benefit whatsoever from drainage of the area of ecchymosis. We will provide clindamycin due to the possibility of an underlying infection as the etiology of the dental caries worsening.   No evidence of an acute fracture that is amenable to coverage calcium hydroxide paste    I was present for all key and critical portions of the procedure as documented by the  MARY: dental block    James Cullen MD  Emergency Medicine Attending Physician         Jesse Babb MD  12/09/22 [address], [place]  12/09/22 [id number]

## 2022-12-10 NOTE — DISCHARGE INSTRUCTIONS
You were seen in the emergency department with dental pain  Your exam confirms you if you had in fact have a fractured left lower molar  Ultimately this will be treated by having a fractured tooth removed by a dental surgeon/dentist  please maintain your appointment for Monday morning  In the interim, please continue alternating ibuprofen and Tylenol around-the-clock for pain control   As we discussed, please avoid triggering foods and drinks including :   too hot or too cold  hard foods  Anything that requires you to chew on the left side    Please begin taking your antibiotic of clindamycin exactly as prescribed even if you begin feeling better. This will prevent an infection or abscess from occurring  The amount of swelling that you are noting on the side of the tooth is likely related to the trauma. Think of it as a bruise or blood collection under the gum. This does not need to be drained as it is not infected. Opening up would actually cause you to be more at risk for developing infection.     Return to the emergency department if you have any of the following symptoms  Significant swelling around the tooth  Swelling under tongue  Swelling under your chin  Feeling like you cannot open your mouth  Changes in your voice  Difficulty swallowing spit  Any other concerning complaint at all

## 2024-06-25 ENCOUNTER — HOSPITAL ENCOUNTER (EMERGENCY)
Age: 24
Discharge: HOME | End: 2024-06-25
Attending: EMERGENCY MEDICINE

## 2024-06-25 VITALS
DIASTOLIC BLOOD PRESSURE: 107 MMHG | WEIGHT: 214.07 LBS | HEIGHT: 63 IN | SYSTOLIC BLOOD PRESSURE: 142 MMHG | RESPIRATION RATE: 14 BRPM | OXYGEN SATURATION: 100 % | TEMPERATURE: 98.4 F | HEART RATE: 88 BPM | BODY MASS INDEX: 37.93 KG/M2

## 2024-06-25 DIAGNOSIS — S05.01XA ABRASION OF RIGHT CORNEA, INITIAL ENCOUNTER: Primary | ICD-10-CM

## 2024-06-25 PROCEDURE — 99283 EMERGENCY DEPT VISIT LOW MDM: CPT

## 2024-06-25 PROCEDURE — 6370000000 HC RX 637 (ALT 250 FOR IP): Performed by: EMERGENCY MEDICINE

## 2024-06-25 RX ORDER — TETRACAINE HYDROCHLORIDE 5 MG/ML
1 SOLUTION OPHTHALMIC ONCE
Status: COMPLETED | OUTPATIENT
Start: 2024-06-25 | End: 2024-06-25

## 2024-06-25 RX ORDER — HYDROCODONE BITARTRATE AND ACETAMINOPHEN 5; 325 MG/1; MG/1
1 TABLET ORAL EVERY 6 HOURS PRN
Qty: 12 TABLET | Refills: 0 | Status: SHIPPED | OUTPATIENT
Start: 2024-06-25 | End: 2024-06-28

## 2024-06-25 RX ORDER — ERYTHROMYCIN 5 MG/G
OINTMENT OPHTHALMIC
Qty: 1 G | Refills: 0 | Status: SHIPPED | OUTPATIENT
Start: 2024-06-25 | End: 2024-07-05

## 2024-06-25 RX ADMIN — TETRACAINE HYDROCHLORIDE 1 DROP: 5 SOLUTION OPHTHALMIC at 13:44

## 2024-06-25 ASSESSMENT — PAIN DESCRIPTION - ORIENTATION: ORIENTATION: RIGHT

## 2024-06-25 ASSESSMENT — VISUAL ACUITY
OS: 20/20
OD: 20/70

## 2024-06-25 ASSESSMENT — PAIN DESCRIPTION - FREQUENCY: FREQUENCY: CONTINUOUS

## 2024-06-25 ASSESSMENT — PAIN SCALES - GENERAL: PAINLEVEL_OUTOF10: 9

## 2024-06-25 ASSESSMENT — PAIN DESCRIPTION - PAIN TYPE: TYPE: ACUTE PAIN

## 2024-06-25 ASSESSMENT — PAIN DESCRIPTION - LOCATION: LOCATION: EYE

## 2024-06-25 ASSESSMENT — PAIN DESCRIPTION - DESCRIPTORS: DESCRIPTORS: ACHING

## 2024-06-25 ASSESSMENT — PAIN - FUNCTIONAL ASSESSMENT: PAIN_FUNCTIONAL_ASSESSMENT: 0-10

## 2024-06-25 NOTE — ED NOTES
Patient not able to tolerate irrigation with Marcin lens. States that she is very anxious and is afraid of lens getting lost in eye. Attempted to explain Marcin lens irrigation process.

## 2024-06-25 NOTE — ED PROVIDER NOTES
EMERGENCY DEPARTMENT ENCOUNTER     [facility] EMERGENCY DEPARTMENT     Pt Name: Danni Hansen   MRN: [id number]   Birthdate 2000   Date of evaluation: 6/25/2024   Provider: ZUHAIR ALMARAZ MD   PCP: Pauline Delcid, X   Note Started: 1:40 PM EDT 6/25/24     CHIEF COMPLAINT     Chief Complaint   Patient presents with    Eye Injury     right        HISTORY OF PRESENT ILLNESS:  History from : Patient   Limitations to history : None     Danni Hansen is a 24 y.o. female who presents with pain in her right eye.  The patient was doing her make-up this morning including putting in her eyelashes when she thinks she got a little bit of glue for her eyelashes in her eye, she gave her foreign body sensation and pain when she was blinking.  She does not have blurred vision just pain with blinking.  She does not wear contacts.    Nursing Notes were all reviewed and agreed with or any disagreements were addressed in the HPI.     ROS: Positives and Pertinent negatives as per HPI.    PAST MEDICAL HISTORY     Past medical history:  has a past medical history of Anxiety, Asthma, Depression, and Urinary reflux.    Past surgical history:  has a past surgical history that includes fracture surgery.      PHYSICAL EXAM:  ED Triage Vitals [06/25/24 1249]   BP Temp Temp src Pulse Respirations SpO2 Height Weight - Scale   (!) 142/107 98.4 °F (36.9 °C) -- 88 14 100 % 1.6 m (5' 3\") 97.1 kg (214 lb 1.1 oz)        Physical Exam   Tearful and anxious in mild distress initially  HEENT pupils are equal round reactive to light, lids and lashes are normal, little bit of swelling in both lids bilaterally, that appears to be primarily from crying.  The conjunctival are pink, the sclera are injected in the right eye, eversion of the eyelids revealed no foreign body, tetracaine was used and the foreign body sensation went away.  Fluorescein revealed a scleral abrasion inferiorly, with a small inferior corneal abrasion not over

## 2024-06-25 NOTE — ED NOTES
Patient given d/c instructions with return verbalization. Emphasis on f/u with [facility] and completing antibiotic. Reviewed eye ointment application. Work note given. Patient ambulated to lobby with steady gait.

## 2025-08-01 ENCOUNTER — HOSPITAL ENCOUNTER (EMERGENCY)
Age: 25
Discharge: HOME | End: 2025-08-01

## 2025-08-01 VITALS
HEART RATE: 92 BPM | DIASTOLIC BLOOD PRESSURE: 88 MMHG | WEIGHT: 231.92 LBS | TEMPERATURE: 98.2 F | HEIGHT: 63 IN | OXYGEN SATURATION: 99 % | SYSTOLIC BLOOD PRESSURE: 132 MMHG | BODY MASS INDEX: 41.09 KG/M2 | RESPIRATION RATE: 16 BRPM

## 2025-08-01 DIAGNOSIS — R11.2 NAUSEA VOMITING AND DIARRHEA: Primary | ICD-10-CM

## 2025-08-01 DIAGNOSIS — R19.7 NAUSEA VOMITING AND DIARRHEA: Primary | ICD-10-CM

## 2025-08-01 DIAGNOSIS — R10.9 ABDOMINAL PAIN, UNSPECIFIED ABDOMINAL LOCATION: ICD-10-CM

## 2025-08-01 LAB
ALBUMIN SERPL-MCNC: 4.3 G/DL (ref 3.4–5)
ALP SERPL-CCNC: 71 U/L (ref 40–129)
ALT SERPL-CCNC: 37 U/L (ref 10–40)
ANION GAP SERPL CALCULATED.3IONS-SCNC: 10 MMOL/L (ref 3–16)
AST SERPL-CCNC: 33 U/L (ref 15–37)
BASOPHILS # BLD: 0 K/UL (ref 0–0.2)
BASOPHILS NFR BLD: 0.4 %
BILIRUB DIRECT SERPL-MCNC: 0.2 MG/DL (ref 0–0.3)
BILIRUB INDIRECT SERPL-MCNC: 0.2 MG/DL (ref 0–1)
BILIRUB SERPL-MCNC: 0.4 MG/DL (ref 0–1)
BUN SERPL-MCNC: 12 MG/DL (ref 7–20)
CALCIUM SERPL-MCNC: 9.2 MG/DL (ref 8.3–10.6)
CHLORIDE SERPL-SCNC: 101 MMOL/L (ref 99–110)
CO2 SERPL-SCNC: 24 MMOL/L (ref 21–32)
CREAT SERPL-MCNC: 0.8 MG/DL (ref 0.6–1.1)
DEPRECATED RDW RBC AUTO: 13.1 % (ref 12.4–15.4)
EOSINOPHIL # BLD: 0.3 K/UL (ref 0–0.6)
EOSINOPHIL NFR BLD: 2.3 %
GFR SERPLBLD CREATININE-BSD FMLA CKD-EPI: >90 ML/MIN/{1.73_M2}
GLUCOSE SERPL-MCNC: 82 MG/DL (ref 70–99)
HCG UR QL: NEGATIVE
HCT VFR BLD AUTO: 40.4 % (ref 36–48)
HGB BLD-MCNC: 13.3 G/DL (ref 12–16)
LIPASE SERPL-CCNC: 12 U/L (ref 13–60)
LYMPHOCYTES # BLD: 2.7 K/UL (ref 1–5.1)
LYMPHOCYTES NFR BLD: 23.1 %
MAGNESIUM SERPL-MCNC: 1.7 MG/DL (ref 1.8–2.4)
MCH RBC QN AUTO: 31.9 PG (ref 26–34)
MCHC RBC AUTO-ENTMCNC: 32.9 G/DL (ref 31–36)
MCV RBC AUTO: 96.8 FL (ref 80–100)
MONOCYTES # BLD: 0.6 K/UL (ref 0–1.3)
MONOCYTES NFR BLD: 5.4 %
NEUTROPHILS # BLD: 8.1 K/UL (ref 1.7–7.7)
NEUTROPHILS NFR BLD: 68.8 %
PLATELET # BLD AUTO: 356 K/UL (ref 135–450)
PMV BLD AUTO: 8.4 FL (ref 5–10.5)
POTASSIUM SERPL-SCNC: 3.4 MMOL/L (ref 3.5–5.1)
PROT SERPL-MCNC: 7.1 G/DL (ref 6.4–8.2)
RBC # BLD AUTO: 4.17 M/UL (ref 4–5.2)
SODIUM SERPL-SCNC: 135 MMOL/L (ref 136–145)
WBC # BLD AUTO: 11.8 K/UL (ref 4–11)

## 2025-08-01 PROCEDURE — 83735 ASSAY OF MAGNESIUM: CPT

## 2025-08-01 PROCEDURE — 84703 CHORIONIC GONADOTROPIN ASSAY: CPT

## 2025-08-01 PROCEDURE — 96375 TX/PRO/DX INJ NEW DRUG ADDON: CPT

## 2025-08-01 PROCEDURE — 6370000000 HC RX 637 (ALT 250 FOR IP): Performed by: EMERGENCY MEDICINE

## 2025-08-01 PROCEDURE — 6360000002 HC RX W HCPCS: Performed by: EMERGENCY MEDICINE

## 2025-08-01 PROCEDURE — 83690 ASSAY OF LIPASE: CPT

## 2025-08-01 PROCEDURE — 96374 THER/PROPH/DIAG INJ IV PUSH: CPT

## 2025-08-01 PROCEDURE — 80076 HEPATIC FUNCTION PANEL: CPT

## 2025-08-01 PROCEDURE — 6370000000 HC RX 637 (ALT 250 FOR IP)

## 2025-08-01 PROCEDURE — 99284 EMERGENCY DEPT VISIT MOD MDM: CPT

## 2025-08-01 PROCEDURE — 85025 COMPLETE CBC W/AUTO DIFF WBC: CPT

## 2025-08-01 PROCEDURE — 80048 BASIC METABOLIC PNL TOTAL CA: CPT

## 2025-08-01 PROCEDURE — 2500000003 HC RX 250 WO HCPCS: Performed by: EMERGENCY MEDICINE

## 2025-08-01 RX ORDER — KETOROLAC TROMETHAMINE 15 MG/ML
15 INJECTION, SOLUTION INTRAMUSCULAR; INTRAVENOUS ONCE
Status: COMPLETED | OUTPATIENT
Start: 2025-08-01 | End: 2025-08-01

## 2025-08-01 RX ORDER — ONDANSETRON 4 MG/1
4 TABLET, ORALLY DISINTEGRATING ORAL 3 TIMES DAILY PRN
Qty: 15 TABLET | Refills: 0 | Status: SHIPPED | OUTPATIENT
Start: 2025-08-01

## 2025-08-01 RX ORDER — DICYCLOMINE HCL 20 MG
20 TABLET ORAL EVERY 6 HOURS PRN
Qty: 12 TABLET | Refills: 0 | Status: SHIPPED | OUTPATIENT
Start: 2025-08-01

## 2025-08-01 RX ORDER — ONDANSETRON 4 MG/1
4 TABLET, ORALLY DISINTEGRATING ORAL ONCE
Status: COMPLETED | OUTPATIENT
Start: 2025-08-01 | End: 2025-08-01

## 2025-08-01 RX ORDER — DICYCLOMINE HYDROCHLORIDE 10 MG/1
20 CAPSULE ORAL ONCE
Status: COMPLETED | OUTPATIENT
Start: 2025-08-01 | End: 2025-08-01

## 2025-08-01 RX ORDER — ONDANSETRON 2 MG/ML
4 INJECTION INTRAMUSCULAR; INTRAVENOUS ONCE
Status: DISCONTINUED | OUTPATIENT
Start: 2025-08-01 | End: 2025-08-01

## 2025-08-01 RX ADMIN — ONDANSETRON 4 MG: 4 TABLET, ORALLY DISINTEGRATING ORAL at 18:53

## 2025-08-01 RX ADMIN — KETOROLAC TROMETHAMINE 15 MG: 15 INJECTION INTRAMUSCULAR at 19:44

## 2025-08-01 RX ADMIN — DICYCLOMINE HYDROCHLORIDE 20 MG: 10 CAPSULE ORAL at 19:39

## 2025-08-01 RX ADMIN — FAMOTIDINE 20 MG: 10 INJECTION, SOLUTION INTRAVENOUS at 19:43

## 2025-08-01 ASSESSMENT — LIFESTYLE VARIABLES
HOW MANY STANDARD DRINKS CONTAINING ALCOHOL DO YOU HAVE ON A TYPICAL DAY: 1 OR 2
HOW OFTEN DO YOU HAVE A DRINK CONTAINING ALCOHOL: 4 OR MORE TIMES A WEEK

## 2025-08-01 ASSESSMENT — PAIN DESCRIPTION - LOCATION: LOCATION: ABDOMEN

## 2025-08-01 ASSESSMENT — PAIN SCALES - GENERAL: PAINLEVEL_OUTOF10: 6

## 2025-08-01 ASSESSMENT — PAIN DESCRIPTION - DESCRIPTORS: DESCRIPTORS: DISCOMFORT;CRAMPING

## 2025-08-01 NOTE — ED PROVIDER NOTES
[facility] EMERGENCY DEPARTMENT     EMERGENCY DEPARTMENT ENCOUNTER            Pt Name: Danni Hansen   MRN: [id number]   Birthdate 2000   Date of evaluation: 8/1/2025   Provider: See Kern MD   PCP: Pauline [facility], X   Note Started: 6:21 PM EDT 8/1/25          CHIEF COMPLAINT     Chief Complaint   Patient presents with    Diarrhea     Pt came to the ED with c/o vomiting and diarrhea along with dizziness             HISTORY OF PRESENT ILLNESS:   History from : Patient   Limitations to history : None     Danni Hansen is a 25 y.o. female who presents with abdominal pain diarrhea.  Patient states that she ate some bad food last night and then woke up in middle night with multiple episodes of vomiting and then today has had persistent diarrhea.  Her vomiting is stopped since approximate 11 AM but she is very nervous about vomiting again.  She denies any history of abdominal surgeries and has generalized abdominal pain.  She states that this is never happened to her in the past.  She ate [employer].    Nursing Notes were all reviewed and agreed with, or any disagreements were addressed in the HPI.     REVIEW OF SYSTEMS :    Positives and Pertinent negatives as per HPI.      MEDICAL HISTORY   has a past medical history of Anxiety, Asthma, Depression, and Urinary reflux.    Past Surgical History:   Procedure Laterality Date    FRACTURE SURGERY      right pinkie at growth plate      CURRENTMEDICATIONS       Discharge Medication List as of 8/1/2025  8:12 PM        CONTINUE these medications which have NOT CHANGED    Details   acetaminophen (TYLENOL) 325 MG tablet Take 650 mg by mouth every 6 hours as needed for PainHistorical Med      !! ondansetron (ZOFRAN ODT) 4 MG disintegrating tablet Take 2 tablets by mouth every 8 hours as needed for Nausea, Disp-20 tablet, R-0Print      naproxen (NAPROSYN) 500 MG tablet Take 1 tablet by mouth 2 times daily (with meals), Disp-30 tablet, R-0Print

## 2025-08-01 NOTE — ED PROVIDER NOTES
rehydrate after receiving Zofran ODT.  Patient had no localized tenderness to the right lower quadrant to suggest appendicitis or localized tenderness of the right upper quadrant to suggest cholecystitis.  Pregnancy test was negative.  CBC shows mild leukocytosis of 11.8 but she was not anemic.  No significant left shift.  Patient had no TROY.  Very minimal hypokalemia 3.4.  LFTs were normal.  Lipase was normal.  Given her benign abdominal exam without localization and the lack of any vomiting or diarrhea here in the emergency department I did not feel that she needed CT imaging at this time.  I suspect that this is likely food poisoning or possibly viral gastroenteritis.  She was encouraged to bring back the stool sample should the diarrhea persist especially given the reports of scant blood in the stool.  She has had good improvement with Bentyl, Pepcid, and Toradol.  Antiemetics and Bentyl were prescribed for outpatient treatment    IMPRESSION:  1. Nausea vomiting and diarrhea    2. Abdominal pain, unspecified abdominal location        Dispo:  Patient will be discharged at this time. Patient was informed of this decision and agrees with plan. I have discussed lab and xray findings with patient and they understand. Questions were answered to the best of my ability.    Discharge vitals:  Blood pressure 132/88, pulse 92, temperature 98.2 °F (36.8 °C), temperature source Oral, resp. rate 16, height 1.6 m (5' 3\"), weight 105.2 kg (231 lb 14.8 oz), SpO2 99%, not currently breastfeeding.    Prescriptions given:   New Prescriptions    DICYCLOMINE (BENTYL) 20 MG TABLET    Take 1 tablet by mouth every 6 hours as needed (abdominal pain)    ONDANSETRON (ZOFRAN-ODT) 4 MG DISINTEGRATING TABLET    Take 1 tablet by mouth 3 times daily as needed for Nausea or Vomiting       Followup:  CHARLI-Agusto thctr, X  [facility]  [address] [facility] [address] [phone]    Schedule an appointment as soon as  possible for a visit             Kianna Eller MD  08/02/25 [id number]

## 2025-08-02 NOTE — DISCHARGE INSTRUCTIONS
Clear liquid diet for 12 hours and then advance slowly to bland diet.  Bring stool sample back if diarrhea persists especially if bloody.  Return for fever, increased abdominal pain, persistent vomiting, large amounts of blood in stool, dizziness or other worsening symptoms.